# Patient Record
Sex: FEMALE | URBAN - METROPOLITAN AREA
[De-identification: names, ages, dates, MRNs, and addresses within clinical notes are randomized per-mention and may not be internally consistent; named-entity substitution may affect disease eponyms.]

---

## 2020-03-07 ENCOUNTER — TRANSFERRED RECORDS (OUTPATIENT)
Dept: HEALTH INFORMATION MANAGEMENT | Facility: CLINIC | Age: 60
End: 2020-03-07

## 2021-04-17 ENCOUNTER — TRANSFERRED RECORDS (OUTPATIENT)
Dept: HEALTH INFORMATION MANAGEMENT | Facility: CLINIC | Age: 61
End: 2021-04-17

## 2021-04-17 LAB
ALBUMIN (URINE) MG/SPEC: 159.3 UG/DL
ALBUMIN/CREATININE RATIO: 180 MG/G CREAT (ref 0–29)
ALT SERPL-CCNC: 16 IU/L (ref 0–32)
AST SERPL-CCNC: 14 IU/L (ref 0–40)
CHOLESTEROL (EXTERNAL): 114 MG/DL (ref 100–199)
CREATININE (EXTERNAL): 1.91 MG/DL (ref 0.57–1)
CREATININE (URINE): 88.5 MG/DL
GFR ESTIMATED (EXTERNAL): 28 ML/MIN/1.73
GFR ESTIMATED (IF AFRICAN AMERICAN) (EXTERNAL): 32 ML/MIN/1.73
GLUCOSE (EXTERNAL): 46 MG/DL (ref 65–99)
HBA1C MFR BLD: 6.5 % (ref 4.8–5.6)
HDLC SERPL-MCNC: 50 MG/DL
LDL CHOLESTEROL (EXTERNAL): 42 MG/DL (ref 0–99)
POTASSIUM (EXTERNAL): 4.7 MMOL/L (ref 3.5–5.2)
TRIGLYCERIDES (EXTERNAL): 128 MG/DL (ref 0–149)
TSH SERPL-ACNC: 1.4 UU/ML (ref 0.45–4.5)

## 2021-04-18 ENCOUNTER — TRANSFERRED RECORDS (OUTPATIENT)
Dept: HEALTH INFORMATION MANAGEMENT | Facility: CLINIC | Age: 61
End: 2021-04-18

## 2021-04-23 ENCOUNTER — TRANSFERRED RECORDS (OUTPATIENT)
Dept: HEALTH INFORMATION MANAGEMENT | Facility: CLINIC | Age: 61
End: 2021-04-23

## 2021-08-19 ENCOUNTER — TRANSFERRED RECORDS (OUTPATIENT)
Dept: HEALTH INFORMATION MANAGEMENT | Facility: CLINIC | Age: 61
End: 2021-08-19

## 2021-09-07 ENCOUNTER — TRANSFERRED RECORDS (OUTPATIENT)
Dept: HEALTH INFORMATION MANAGEMENT | Facility: CLINIC | Age: 61
End: 2021-09-07

## 2023-05-08 ENCOUNTER — DOCUMENTATION ONLY (OUTPATIENT)
Dept: TRANSPLANT | Facility: CLINIC | Age: 63
End: 2023-05-08

## 2023-05-08 NOTE — PROGRESS NOTES
UNOS DATA REPORTING EPISODE CLEAN UP:  Kidney episode tracking updated to NOT FOLLOWED, reason TRANSPLANTED AT ANOTHER CENTER, in accordance with clinical follow up and historic tracking.      Pancreas episode tracking updated to REPORTING FOLLOW UP ONLY, in accordance with clinical follow up.